# Patient Record
Sex: MALE | Race: WHITE | NOT HISPANIC OR LATINO | Employment: OTHER | URBAN - METROPOLITAN AREA
[De-identification: names, ages, dates, MRNs, and addresses within clinical notes are randomized per-mention and may not be internally consistent; named-entity substitution may affect disease eponyms.]

---

## 2022-01-20 ENCOUNTER — HOSPITAL ENCOUNTER (EMERGENCY)
Facility: HOSPITAL | Age: 37
Discharge: HOME | End: 2022-01-20
Attending: EMERGENCY MEDICINE | Admitting: EMERGENCY MEDICINE
Payer: COMMERCIAL

## 2022-01-20 VITALS
HEART RATE: 88 BPM | SYSTOLIC BLOOD PRESSURE: 157 MMHG | HEIGHT: 74 IN | RESPIRATION RATE: 18 BRPM | OXYGEN SATURATION: 99 % | BODY MASS INDEX: 40.43 KG/M2 | WEIGHT: 315 LBS | TEMPERATURE: 98.4 F | DIASTOLIC BLOOD PRESSURE: 84 MMHG

## 2022-01-20 DIAGNOSIS — L02.11 NECK ABSCESS: Primary | ICD-10-CM

## 2022-01-20 PROCEDURE — 99283 EMERGENCY DEPT VISIT LOW MDM: CPT

## 2022-01-20 PROCEDURE — 99282 EMERGENCY DEPT VISIT SF MDM: CPT | Performed by: EMERGENCY MEDICINE

## 2022-01-20 NOTE — ED PROVIDER NOTES
History  Chief Complaint   Patient presents with    Neck Pain     abscess in neck/jaw right side - was seen a couple weeks ago for it and put on abx but feels the sensativity still     HPI patient is a 69-year-old male, patient apparently was seen this month for a swollen right cervical node  Patient reports a large lump under his right chin that was treated with both Keflex and Bactrim  Patient reports that swelling has gone down but he has subsequently been tested and is positive for COVID  Patient was told since the intermittently still had fever he should come and be evaluated to make sure the abscess did not require drainage  Patient reports abscess appears markedly better he reports significant swelling under his jaw with a golf ball sized lump under his right mandible  Patient reports he antibiotic seem to have work but he wanted to be checked  Patient denies any difficulty swallowing  Denies difficulty breathing  Denies any persistent fever now but reports he has had fever over the last few days  Patient did test positive for COVID on January 13th  Patient denies any acute shortness of breath  He denies any headache  There is no difficulty breathing  Past medical history recently infected cervical lump, currently diagnosed with COVID  Family history noncontributory   Social history, smoker, currently on Suboxone    Prior to Admission Medications   Prescriptions Last Dose Informant Patient Reported? Taking?    buprenorphine-naloxone (SUBOXONE) 8-2 mg per SL tablet   Yes No   Sig: Place 1 tablet under the tongue daily   ibuprofen (MOTRIN) 600 mg tablet   No No   Sig: Take 1 tablet by mouth every 6 (six) hours as needed for mild pain for up to 10 days   lidocaine (LMX) 4 % cream   No No   Sig: Apply 1 application topically 3 (three) times a day for 30 days   venlafaxine (EFFEXOR) 100 MG tablet   Yes No   Sig: Take 300 mg by mouth 2 (two) times a day      Facility-Administered Medications: None       History reviewed  No pertinent past medical history  History reviewed  No pertinent surgical history  History reviewed  No pertinent family history  I have reviewed and agree with the history as documented  E-Cigarette/Vaping     E-Cigarette/Vaping Substances     Social History     Tobacco Use    Smoking status: Current Every Day Smoker     Packs/day: 0 50     Types: Cigarettes    Smokeless tobacco: Not on file   Substance Use Topics    Alcohol use: No    Drug use: No       Review of Systems   Constitutional: Positive for fever  Negative for diaphoresis and fatigue  HENT: Negative for congestion, ear pain, nosebleeds and sore throat  Eyes: Negative for photophobia, pain, discharge and visual disturbance  Respiratory: Negative for cough, choking, chest tightness, shortness of breath and wheezing  Cardiovascular: Negative for chest pain and palpitations  Gastrointestinal: Negative for abdominal distention, abdominal pain, diarrhea and vomiting  Genitourinary: Negative for dysuria, flank pain and frequency  Musculoskeletal: Negative for back pain, gait problem and joint swelling  Skin: Negative for color change and rash  Neurological: Negative for dizziness, syncope and headaches  Psychiatric/Behavioral: Negative for behavioral problems and confusion  The patient is not nervous/anxious  All other systems reviewed and are negative  Reports intermittent fever but none now  Reports a lump under his right mandible now seems markedly improved after antibiotics  Physical Exam  Physical Exam  Vitals and nursing note reviewed  Constitutional:       Appearance: He is well-developed  HENT:      Head: Normocephalic  Right Ear: External ear normal       Left Ear: External ear normal       Nose: Nose normal       Mouth/Throat:      Mouth: Mucous membranes are moist       Pharynx: Oropharynx is clear  No oropharyngeal exudate or posterior oropharyngeal erythema  Eyes:      General: Lids are normal       Pupils: Pupils are equal, round, and reactive to light  Neck:      Comments: The patient points to the right-sided cervical anterior chain as the source of where his infected lymph node was  I do not feel any swelling or any sign of infection in that area  Patient reports there is still a small area that appears to be tender where the lymph node was  Again on physical exam I do not appreciate any swelling I do not appreciate any fullness  Physical exam is consistent with a normal anterior cervical chain of lymph nodes  I discussed with patient I think he has had almost complete resolution of this infected left gland  There is no stridor there is no drooling there is no sign of mass lesion  There is no indication for workup at this time  Cardiovascular:      Rate and Rhythm: Normal rate and regular rhythm  Pulses: Normal pulses  Heart sounds: Normal heart sounds  Pulmonary:      Effort: Pulmonary effort is normal  No respiratory distress  Breath sounds: Normal breath sounds  Musculoskeletal:         General: No deformity  Normal range of motion  Cervical back: Normal range of motion and neck supple  Skin:     General: Skin is warm and dry  Neurological:      Mental Status: He is alert and oriented to person, place, and time           Vital Signs  ED Triage Vitals [01/20/22 1546]   Temperature Pulse Respirations Blood Pressure SpO2   98 4 °F (36 9 °C) 88 18 157/84 99 %      Temp Source Heart Rate Source Patient Position - Orthostatic VS BP Location FiO2 (%)   Oral Monitor Sitting Left arm --      Pain Score       --           Vitals:    01/20/22 1546   BP: 157/84   Pulse: 88   Patient Position - Orthostatic VS: Sitting         Visual Acuity      ED Medications  Medications - No data to display    Diagnostic Studies  Results Reviewed     None                 No orders to display              Procedures  Procedures         ED Course SBIRT 22yo+      Most Recent Value   SBIRT (24 yo +)    In order to provide better care to our patients, we are screening all of our patients for alcohol and drug use  Would it be okay to ask you these screening questions? No Filed at: 01/20/2022 0709                    [facility] medical decision making 71-year-old male, history of a recent cervical lymph node infection and now with COVID, concerned because he still has fever  Discussed with patient I believe the fever is related to his COVID diagnosis  His lymph node was treated with Keflex and Bactrim and he has marked resolution of his lymph node swelling  I do not palpate an abscess  There is no indication for incision and drainage or imaging  We discussed outpatient treatment and follow-up  Patient was reassured  Disposition  Final diagnoses:   Neck abscess - Resolving     Time reflects when diagnosis was documented in both MDM as applicable and the Disposition within this note     Time User Action Codes Description Comment    1/20/2022  4:14 PM Rosa Hernandez Add [L02 11] Neck abscess     1/20/2022  4:14 PM Rosa Hernandez Modify [L02 11] Neck abscess Resolving      ED Disposition     ED Disposition Condition Date/Time Comment    Discharge Stable Thu Jan 20, 2022  4:13 PM Cuco Arreola discharge to home/self care  Follow-up Information     Follow up With Specialties Details Why Contact Info    Ruben Garibay MD Otolaryngology   [address] [place] [address]            Discharge Medication List as of 1/20/2022  4:14 PM      CONTINUE these medications which have NOT CHANGED    Details   buprenorphine-naloxone (SUBOXONE) 8-2 mg per SL tablet Place 1 tablet under the tongue daily, Until Discontinued, Historical Med      ibuprofen (MOTRIN) 600 mg tablet Take 1 tablet by mouth every 6 (six) hours as needed for mild pain for up to 10 days, Starting 12/12/2016, Until Thu 12/22/16, Print lidocaine (LMX) 4 % cream Apply 1 application topically 3 (three) times a day for 30 days, Starting 12/12/2016, Until Wed 1/11/17, Print      venlafaxine (EFFEXOR) 100 MG tablet Take 300 mg by mouth 2 (two) times a day, Until Discontinued, Historical Med             No discharge procedures on file      PDMP Review     None          ED Provider  Electronically Signed by           Maria Elena Cedillo MD  01/21/22 1131

## 2022-01-20 NOTE — DISCHARGE INSTRUCTIONS
Abscess appears to be resolving at this time    Return increasing redness swelling or any problems  Follow up with Dr Joey Dickey for lymph node removal if persist and stays painful